# Patient Record
Sex: FEMALE | Race: WHITE | Employment: FULL TIME | URBAN - METROPOLITAN AREA
[De-identification: names, ages, dates, MRNs, and addresses within clinical notes are randomized per-mention and may not be internally consistent; named-entity substitution may affect disease eponyms.]

---

## 2018-11-11 ENCOUNTER — APPOINTMENT (OUTPATIENT)
Dept: GENERAL RADIOLOGY | Age: 38
End: 2018-11-11
Payer: COMMERCIAL

## 2018-11-11 ENCOUNTER — HOSPITAL ENCOUNTER (EMERGENCY)
Age: 38
Discharge: HOME | End: 2018-11-11
Payer: COMMERCIAL

## 2018-11-11 VITALS
DIASTOLIC BLOOD PRESSURE: 98 MMHG | TEMPERATURE: 98.4 F | SYSTOLIC BLOOD PRESSURE: 158 MMHG | RESPIRATION RATE: 16 BRPM | WEIGHT: 179.23 LBS | OXYGEN SATURATION: 98 % | HEART RATE: 96 BPM

## 2018-11-11 DIAGNOSIS — S92.302A MULTIPLE CLOSED FRACTURES OF METATARSAL BONE OF LEFT FOOT, INITIAL ENCOUNTER: Primary | ICD-10-CM

## 2018-11-11 PROCEDURE — 73620 X-RAY EXAM OF FOOT: CPT

## 2018-11-11 PROCEDURE — 4500000023 HC ED LEVEL 3 PROCEDURE

## 2018-11-11 PROCEDURE — 73630 X-RAY EXAM OF FOOT: CPT

## 2018-11-11 PROCEDURE — 6370000000 HC RX 637 (ALT 250 FOR IP): Performed by: PHYSICIAN ASSISTANT

## 2018-11-11 PROCEDURE — 99283 EMERGENCY DEPT VISIT LOW MDM: CPT

## 2018-11-11 RX ORDER — OXYCODONE HYDROCHLORIDE AND ACETAMINOPHEN 5; 325 MG/1; MG/1
1 TABLET ORAL ONCE
Status: COMPLETED | OUTPATIENT
Start: 2018-11-11 | End: 2018-11-11

## 2018-11-11 RX ORDER — OXYCODONE HYDROCHLORIDE AND ACETAMINOPHEN 5; 325 MG/1; MG/1
1 TABLET ORAL EVERY 6 HOURS PRN
Qty: 12 TABLET | Refills: 0 | Status: SHIPPED | OUTPATIENT
Start: 2018-11-11 | End: 2018-11-16

## 2018-11-11 RX ORDER — LORATADINE 10 MG/1
TABLET ORAL
COMMUNITY

## 2018-11-11 RX ADMIN — OXYCODONE AND ACETAMINOPHEN 1 TABLET: 5; 325 TABLET ORAL at 22:27

## 2018-11-11 ASSESSMENT — PAIN DESCRIPTION - LOCATION
LOCATION_2: FOOT
LOCATION: EAR

## 2018-11-11 ASSESSMENT — PAIN DESCRIPTION - PAIN TYPE
TYPE_2: ACUTE PAIN
TYPE: ACUTE PAIN

## 2018-11-11 ASSESSMENT — PAIN DESCRIPTION - DESCRIPTORS
DESCRIPTORS_2: ACHING
DESCRIPTORS: ACHING;SORE

## 2018-11-11 ASSESSMENT — PAIN DESCRIPTION - INTENSITY
RATING_2: 9

## 2018-11-11 ASSESSMENT — PAIN SCALES - GENERAL
PAINLEVEL_OUTOF10: 3
PAINLEVEL_OUTOF10: 2
PAINLEVEL_OUTOF10: 2

## 2018-11-11 ASSESSMENT — PAIN DESCRIPTION - FREQUENCY: FREQUENCY: CONTINUOUS

## 2018-11-11 ASSESSMENT — PAIN DESCRIPTION - DURATION: DURATION_2: CONTINUOUS

## 2018-11-11 ASSESSMENT — PAIN DESCRIPTION - ORIENTATION
ORIENTATION: RIGHT
ORIENTATION_2: LEFT

## 2018-11-12 NOTE — ED PROVIDER NOTES
examination of the right ear and surrounding scalp, negative for swelling, ecchymosis or laceration. Negative for mastoid tenderness, raccoon eyes, or wolfe sign. Right TM normal, ear canal clear. PERRLA and EOM normal bilaterally. Cardiovascular:  Normal rate, regular rhythm, normal heart sounds and intact distal pulses. Pulmonary/Chest:  Effort normal and breath sounds normal. No respiratory distress. Musculoskeletal:  Moderate swelling and ecchymosis noted to the dorsal left foot over the proximal metatarsals, with significant tenderness to palpation in this area. 2+ dorsalis pedis pulse on the left. Normal examination of the left ankle, negative for bony tenderness, with good range of motion. Sensation to light touch grossly intact and capillary refill <3 seconds in the digits of the left lower extremity. Neurological:  Oriented to person, place, and time. No cranial nerve deficit. Skin:  Skin is warm and dry. Not diaphoretic. Psychiatric:  Normal mood, affect, behavior, judgment and thought content. DIAGNOSTIC RESULTS     RADIOLOGY:     Interpretation per the Radiologist below, if available at the time of this note:    XR FOOT LEFT (MIN 3 VIEWS)   Final Result   Acute 2nd, 3rd and 4th metatarsal base fractures. Small linear calcification along the medial aspect of the 1st tarsometatarsal   joint could represent a small avulsion fracture fragment. LABS:  Labs Reviewed - No data to display    All other labs were within normal range or not returned as of this dictation.     EMERGENCY DEPARTMENT COURSE and DIFFERENTIAL DIAGNOSIS/MDM:   Vitals:    Vitals:    11/11/18 2049 11/11/18 2215   BP: (!) 163/117 (!) 158/98   Pulse: 98 96   Resp: 15 16   Temp: 98.6 °F (37 °C) 98.4 °F (36.9 °C)   TempSrc: Temporal Oral   SpO2: 98% 98%   Weight: 179 lb 3.7 oz (81.3 kg)        The patient's condition in the ED was good, the patient was afebrile and nontoxic in appearance, and the patient's CLARE Owens
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
## 2018-11-14 ENCOUNTER — TELEPHONE (OUTPATIENT)
Dept: ORTHOPEDIC SURGERY | Age: 38
End: 2018-11-14

## 2018-11-14 ENCOUNTER — OFFICE VISIT (OUTPATIENT)
Dept: ORTHOPEDIC SURGERY | Age: 38
End: 2018-11-14
Payer: COMMERCIAL

## 2018-11-14 VITALS — BODY MASS INDEX: 28.77 KG/M2 | RESPIRATION RATE: 16 BRPM | WEIGHT: 179 LBS | HEIGHT: 66 IN

## 2018-11-14 DIAGNOSIS — S92.342A CLOSED DISPLACED FRACTURE OF FOURTH METATARSAL BONE OF LEFT FOOT, INITIAL ENCOUNTER: ICD-10-CM

## 2018-11-14 DIAGNOSIS — S92.322A CLOSED DISPLACED FRACTURE OF SECOND METATARSAL BONE OF LEFT FOOT, INITIAL ENCOUNTER: Primary | ICD-10-CM

## 2018-11-14 DIAGNOSIS — S92.332A CLOSED DISPLACED FRACTURE OF THIRD METATARSAL BONE OF LEFT FOOT, INITIAL ENCOUNTER: ICD-10-CM

## 2018-11-14 PROCEDURE — 28470 CLTX METATARSAL FX WO MNP EA: CPT | Performed by: ORTHOPAEDIC SURGERY

## 2018-11-14 PROCEDURE — 99203 OFFICE O/P NEW LOW 30 MIN: CPT | Performed by: ORTHOPAEDIC SURGERY

## 2018-11-14 PROCEDURE — L4361 PNEUMA/VAC WALK BOOT PRE OTS: HCPCS | Performed by: ORTHOPAEDIC SURGERY

## 2018-11-14 RX ORDER — HYDROCODONE BITARTRATE AND ACETAMINOPHEN 5; 325 MG/1; MG/1
1 TABLET ORAL EVERY 6 HOURS PRN
Qty: 21 TABLET | Refills: 0 | Status: SHIPPED | OUTPATIENT
Start: 2018-11-14 | End: 2018-11-21

## 2018-11-14 NOTE — TELEPHONE ENCOUNTER
Pt was just seen today and told she was getting a medication but her pharmacy doesn't have anything for her   pls call pt thanks

## 2018-11-17 NOTE — PROGRESS NOTES
alert, and oriented. She is well dressed, nourished and  groomed. Patient with normal affect. Height is  5' 6\" (1.676 m), weight is 179 lb (81.2 kg), Body mass index is 28.89 kg/m². Resting respiratory rate is 16. Examination of the gait, showed that the patient walks with a crutch, NWB left  leg . Examination of both ankles showing a decreased range of motion of the left ankle compare to the other side because of foot pain. There is moderate swelling that can be seen, as well as ecchymosis over midfoot side of the left foot. She  has intact sensation and good pedal pulses. She has significant tenderness on deep palpation over the 2nd, 3rd and 4th MT base left foot        IMAGING: Xray's were reviewed, dated 11/11/2018, 3 views of the left  foot, and showed a minimally displaced 2nd, 3rd and 4th MT base fracture. IMPRESSION: Left foot minimally displaced 2nd, 3rd and 4th MT base fracture. PLAN: I discussed that the overall alignment of these fracture are good and that we can try to treat this non-operatively in a boot with NWB. We discussed the risk of nonunion and  malunion. We applied a short boot today in the office and instructed the patient  in care. We will see her  back in 6 weeks at which time we will get a new xray of the left foot. Procedures    [place] CLOSED RX METATARSAL FX    [place] CLOSED RX METATARSAL FX    [place] CLOSED RX METATARSAL FX    Breg Short Genisus Walking Boot     Patient was prescribed a Breg Short Genisus Walking Boot. The left foot will require stabilization / immobilization from this semi-rigid / rigid orthosis to improve their function. The orthosis will assist in protecting the affected area, provide functional support and facilitate healing. Patient was instructed to progress ambulation  as non weight bearing in the device. The plan of care is to progress the patient to full weight bearing status.      The patient was educated and fit by a

## 2018-12-21 ENCOUNTER — OFFICE VISIT (OUTPATIENT)
Dept: ORTHOPEDIC SURGERY | Age: 38
End: 2018-12-21

## 2018-12-21 VITALS
HEART RATE: 69 BPM | HEIGHT: 66 IN | SYSTOLIC BLOOD PRESSURE: 140 MMHG | DIASTOLIC BLOOD PRESSURE: 95 MMHG | WEIGHT: 179 LBS | BODY MASS INDEX: 28.77 KG/M2 | RESPIRATION RATE: 16 BRPM

## 2018-12-21 DIAGNOSIS — S92.332A CLOSED DISPLACED FRACTURE OF THIRD METATARSAL BONE OF LEFT FOOT, INITIAL ENCOUNTER: ICD-10-CM

## 2018-12-21 DIAGNOSIS — S92.342A CLOSED DISPLACED FRACTURE OF FOURTH METATARSAL BONE OF LEFT FOOT, INITIAL ENCOUNTER: Primary | ICD-10-CM

## 2018-12-21 DIAGNOSIS — S92.322A CLOSED DISPLACED FRACTURE OF SECOND METATARSAL BONE OF LEFT FOOT, INITIAL ENCOUNTER: ICD-10-CM

## 2018-12-21 PROCEDURE — APPNB30 APP NON BILLABLE TIME 0-30 MINS: Performed by: NURSE PRACTITIONER

## 2018-12-21 PROCEDURE — 99024 POSTOP FOLLOW-UP VISIT: CPT | Performed by: NURSE PRACTITIONER

## 2018-12-21 NOTE — LETTER
[facility]  [address]  [address] [facility] [address]  Phone: [phone]  Fax: [phone]    Camila Huggins MD        December 21, 2018     Patient: Kiera Albarran   YOB: 1980   Date of Visit: 12/21/2018       To Whom It May Concern: It is my medical opinion that Kiera Albarran requires a disability parking placard for the following reasons:  She has limited walking ability due to an orthopedic condition. Duration of need: 3 months    If you have any questions or concerns, please don't hesitate to call.     Sincerely,          Camila Huggins MD

## 2019-02-22 ENCOUNTER — OFFICE VISIT (OUTPATIENT)
Dept: ORTHOPEDIC SURGERY | Age: 39
End: 2019-02-22
Payer: COMMERCIAL

## 2019-02-22 VITALS — BODY MASS INDEX: 28.77 KG/M2 | WEIGHT: 179 LBS | HEART RATE: 60 BPM | RESPIRATION RATE: 16 BRPM | HEIGHT: 66 IN

## 2019-02-22 DIAGNOSIS — S92.342A CLOSED DISPLACED FRACTURE OF FOURTH METATARSAL BONE OF LEFT FOOT, INITIAL ENCOUNTER: ICD-10-CM

## 2019-02-22 DIAGNOSIS — S92.332A CLOSED DISPLACED FRACTURE OF THIRD METATARSAL BONE OF LEFT FOOT, INITIAL ENCOUNTER: ICD-10-CM

## 2019-02-22 DIAGNOSIS — S92.322A CLOSED DISPLACED FRACTURE OF SECOND METATARSAL BONE OF LEFT FOOT, INITIAL ENCOUNTER: Primary | ICD-10-CM

## 2019-02-22 PROCEDURE — 99213 OFFICE O/P EST LOW 20 MIN: CPT | Performed by: NURSE PRACTITIONER

## 2020-05-16 ENCOUNTER — APPOINTMENT (OUTPATIENT)
Dept: GENERAL RADIOLOGY | Age: 40
End: 2020-05-16
Payer: COMMERCIAL

## 2020-05-16 ENCOUNTER — HOSPITAL ENCOUNTER (EMERGENCY)
Age: 40
Discharge: HOME | End: 2020-05-16
Payer: COMMERCIAL

## 2020-05-16 VITALS
BODY MASS INDEX: 30.07 KG/M2 | OXYGEN SATURATION: 98 % | HEART RATE: 91 BPM | WEIGHT: 191.58 LBS | HEIGHT: 67 IN | DIASTOLIC BLOOD PRESSURE: 97 MMHG | SYSTOLIC BLOOD PRESSURE: 151 MMHG | RESPIRATION RATE: 24 BRPM | TEMPERATURE: 98.5 F

## 2020-05-16 PROCEDURE — 96372 THER/PROPH/DIAG INJ SC/IM: CPT

## 2020-05-16 PROCEDURE — 2580000003 HC RX 258: Performed by: NURSE PRACTITIONER

## 2020-05-16 PROCEDURE — 90471 IMMUNIZATION ADMIN: CPT | Performed by: NURSE PRACTITIONER

## 2020-05-16 PROCEDURE — 96374 THER/PROPH/DIAG INJ IV PUSH: CPT

## 2020-05-16 PROCEDURE — 12011 RPR F/E/E/N/L/M 2.5 CM/<: CPT

## 2020-05-16 PROCEDURE — 2500000003 HC RX 250 WO HCPCS: Performed by: NURSE PRACTITIONER

## 2020-05-16 PROCEDURE — 73130 X-RAY EXAM OF HAND: CPT

## 2020-05-16 PROCEDURE — 99283 EMERGENCY DEPT VISIT LOW MDM: CPT

## 2020-05-16 PROCEDURE — 6360000002 HC RX W HCPCS: Performed by: NURSE PRACTITIONER

## 2020-05-16 PROCEDURE — 90715 TDAP VACCINE 7 YRS/> IM: CPT | Performed by: NURSE PRACTITIONER

## 2020-05-16 RX ORDER — SACCHAROMYCES BOULARDII 250 MG
250 CAPSULE ORAL 3 TIMES DAILY
Qty: 21 CAPSULE | Refills: 0 | Status: SHIPPED | OUTPATIENT
Start: 2020-05-16 | End: 2020-05-23

## 2020-05-16 RX ORDER — FLUCONAZOLE 150 MG/1
150 TABLET ORAL ONCE
Qty: 2 TABLET | Refills: 0 | Status: SHIPPED | OUTPATIENT
Start: 2020-05-16 | End: 2020-05-16

## 2020-05-16 RX ORDER — MORPHINE SULFATE 4 MG/ML
4 INJECTION, SOLUTION INTRAMUSCULAR; INTRAVENOUS ONCE
Status: COMPLETED | OUTPATIENT
Start: 2020-05-16 | End: 2020-05-16

## 2020-05-16 RX ORDER — AMOXICILLIN AND CLAVULANATE POTASSIUM 875; 125 MG/1; MG/1
1 TABLET, FILM COATED ORAL 2 TIMES DAILY
Qty: 20 TABLET | Refills: 0 | Status: SHIPPED | OUTPATIENT
Start: 2020-05-16 | End: 2020-05-26

## 2020-05-16 RX ORDER — HYDROCODONE BITARTRATE AND ACETAMINOPHEN 5; 325 MG/1; MG/1
1 TABLET ORAL EVERY 6 HOURS PRN
Qty: 12 TABLET | Refills: 0 | Status: SHIPPED | OUTPATIENT
Start: 2020-05-16 | End: 2020-05-19

## 2020-05-16 RX ORDER — BUPIVACAINE HYDROCHLORIDE AND EPINEPHRINE 5; 5 MG/ML; UG/ML
10 INJECTION, SOLUTION EPIDURAL; INTRACAUDAL; PERINEURAL ONCE
Status: COMPLETED | OUTPATIENT
Start: 2020-05-16 | End: 2020-05-16

## 2020-05-16 RX ORDER — IBUPROFEN 800 MG/1
800 TABLET ORAL EVERY 8 HOURS PRN
Qty: 30 TABLET | Refills: 0 | Status: ON HOLD | OUTPATIENT
Start: 2020-05-16 | End: 2022-08-26 | Stop reason: SDUPTHER

## 2020-05-16 RX ADMIN — BUPIVACAINE HYDROCHLORIDE AND EPINEPHRINE BITARTRATE 10 ML: 5; .005 INJECTION, SOLUTION EPIDURAL; INTRACAUDAL; PERINEURAL at 21:34

## 2020-05-16 RX ADMIN — MORPHINE SULFATE 4 MG: 4 INJECTION, SOLUTION INTRAMUSCULAR; INTRAVENOUS at 20:56

## 2020-05-16 RX ADMIN — AMPICILLIN SODIUM AND SULBACTAM SODIUM 3 G: 2; 1 INJECTION, POWDER, FOR SOLUTION INTRAMUSCULAR; INTRAVENOUS at 21:34

## 2020-05-16 RX ADMIN — TETANUS TOXOID, REDUCED DIPHTHERIA TOXOID AND ACELLULAR PERTUSSIS VACCINE, ADSORBED 0.5 ML: 5; 2.5; 8; 8; 2.5 SUSPENSION INTRAMUSCULAR at 20:54

## 2020-05-16 ASSESSMENT — PAIN SCALES - GENERAL
PAINLEVEL_OUTOF10: 4
PAINLEVEL_OUTOF10: 9
PAINLEVEL_OUTOF10: 4
PAINLEVEL_OUTOF10: 9

## 2020-05-16 ASSESSMENT — PAIN DESCRIPTION - PROGRESSION: CLINICAL_PROGRESSION: GRADUALLY WORSENING

## 2020-05-16 ASSESSMENT — PAIN DESCRIPTION - PAIN TYPE: TYPE: ACUTE PAIN

## 2020-05-16 ASSESSMENT — PAIN DESCRIPTION - ONSET: ONSET: SUDDEN

## 2020-05-16 ASSESSMENT — PAIN DESCRIPTION - ORIENTATION: ORIENTATION: LEFT

## 2020-05-16 ASSESSMENT — PAIN DESCRIPTION - LOCATION: LOCATION: HAND

## 2020-05-16 ASSESSMENT — ENCOUNTER SYMPTOMS: COLOR CHANGE: 0

## 2020-05-16 ASSESSMENT — PAIN - FUNCTIONAL ASSESSMENT: PAIN_FUNCTIONAL_ASSESSMENT: PREVENTS OR INTERFERES SOME ACTIVE ACTIVITIES AND ADLS

## 2020-05-16 ASSESSMENT — PAIN DESCRIPTION - DESCRIPTORS: DESCRIPTORS: ACHING;BURNING;THROBBING

## 2020-05-16 ASSESSMENT — PAIN DESCRIPTION - FREQUENCY: FREQUENCY: CONTINUOUS

## 2020-05-17 NOTE — ED PROVIDER NOTES
visualized      Contaminated: no    Treatment:     Wound cleansed with: Chlorhexidine. Amount of cleaning:  Extensive  Skin repair:     Repair method:  Sutures    Suture size:  6-0    Suture material:  Nylon    Suture technique:  Simple interrupted    Number of sutures:  2  Approximation:     Approximation:  Loose  Post-procedure details:     Dressing:  Sterile dressing    Patient tolerance of procedure: Tolerated well, no immediate complications  Lac Repair  Date/Time: 5/16/2020 10:33 PM  Performed by: ROLANDO Welch CNP  Authorized by: ROLANDO Welch CNP     Consent:     Consent obtained:  Verbal    Consent given by:  Patient    Risks discussed:  Infection, need for additional repair, nerve damage, poor wound healing, poor cosmetic result, pain, retained foreign body, tendon damage and vascular damage    Alternatives discussed:  No treatment  Anesthesia (see MAR for exact dosages): Anesthesia method:  Local infiltration    Local anesthetic:  Bupivacaine 0.5% WITH epi  Laceration details:     Location:  Finger    Finger location:  L small finger    Length (cm):  1  Repair type:     Repair type:  Simple  Pre-procedure details:     Preparation:  Imaging obtained to evaluate for foreign bodies and patient was prepped and draped in usual sterile fashion  Exploration:     Hemostasis achieved with:  Epinephrine and direct pressure    Wound exploration: wound explored through full range of motion and entire depth of wound probed and visualized      Contaminated: no    Treatment:     Wound cleansed with: Chlorhexidine. Amount of cleaning:  Extensive  Skin repair:     Repair method:  Sutures    Suture size:  6-0    Suture material:  Nylon    Suture technique:  Simple interrupted    Number of sutures:  1  Approximation:     Approximation:  Loose  Post-procedure details:     Dressing:  Sterile dressing    Patient tolerance of procedure:   Tolerated well, no immediate complications          Patient was given:  Medications   ampicillin-sulbactam (UNASYN) 3 g in sodium chloride 0.9 % 100 mL IVPB (0 g Intravenous Stopped 5/16/20 2228)   Tetanus-Diphth-Acell Pertussis (BOOSTRIX) injection 0.5 mL (0.5 mLs Intramuscular Given 5/16/20 2054)   morphine (PF) injection 4 mg (4 mg Intravenous Given 5/16/20 2056)   bupivacaine-EPINEPHrine PF (MARCAINE-w/EPINEPHRINE) 0.5% -[id number] injection 10 mL (10 mLs Intradermal Given by Other 5/16/20 2134)       We discussed the issue of likelihood of rabies in the offending animal and risk/benefits of the rabies vaccination. I explained to the patient that there have been no incidences of rabies from dog bites in [place], and that vaccination is currently not recommended by the Health Department. Differential diagnosis: Cellulitis, abscess, foreign body retention (tooth), tetanus, rabies, deep space infection, osteomyelitis, septic joint, other     Patient is afebrile and nontoxic in appearance. Plain films as above, no retained foreign body. No crepitus. Distal extremity is pink and well-perfused. Extensor and flexor tendons intact. No bone tendon or foreign body exposed. Wounds were anesthetized and then copiously irrigated and vigorously scrubbed. X-ray shows no fracture or foreign body. She had one laceration over the DIP of the fifth digit of the left hand, so I put 1 suture in it to loosely approximated. She also had a gaping 2 cm laceration on the dorsum of the left hand, so I put 2 sutures in it to loosely approximated. Other puncture wounds were not closed obviously. Due to the extent of puncture wounds I gave her dose of IV Unasyn in the ER and discharged home with Augmentin. I strongly encouraged her to follow-up with hand surgery on Monday for reassessment. I advised her to return immediately for any signs of infection and gave her the symptoms of that.   At this time, the evidence for any other entities in the differential is insufficient to justify any ROLANDO - Giovanna Zamora, ROLANDO - CNP  05/16/20 2805

## 2020-05-17 NOTE — ED NOTES
Went in pt room to clean wound on left hand. With soap and water. per NP junito order.       Benito Aguilar  05/16/20 0552

## 2020-05-18 ENCOUNTER — TELEPHONE (OUTPATIENT)
Dept: ORTHOPEDIC SURGERY | Age: 40
End: 2020-05-18

## 2020-05-19 ENCOUNTER — VIRTUAL VISIT (OUTPATIENT)
Dept: ORTHOPEDIC SURGERY | Age: 40
End: 2020-05-19
Payer: COMMERCIAL

## 2020-05-19 VITALS — BODY MASS INDEX: 28.93 KG/M2 | HEIGHT: 66 IN | WEIGHT: 180 LBS

## 2020-05-19 PROCEDURE — 26600 TREAT METACARPAL FRACTURE: CPT | Performed by: ORTHOPAEDIC SURGERY

## 2020-05-19 PROCEDURE — 99203 OFFICE O/P NEW LOW 30 MIN: CPT | Performed by: ORTHOPAEDIC SURGERY

## 2020-05-19 NOTE — PATIENT INSTRUCTIONS
Protocol for Managing Open Wounds  Dr. Uriel Mckeon            1. Please remove all dressings so that you may see the skin fully. 2. Prepare a clean sink full of warm water (bath temperature). Add one to two squirts of liquid antibacterial soap. Insert hand and soak for approximately five minutes, making sure to exercise your motion of fingers and wrists fully while soaking in warm water. 3. After finished soaking, pat wound dry. 4. Apply dressing as instructed in the office. A. Layer #1 - Adaptic gauze. B. Layer #2 - Light cotton gauze. Suan Sera #3 - Tape or other covering. 5. I typically recommend soaking two to three times per day with a clean dressing change after each soak. Hand Range of Motion Instructions      Dr. Judy Beaver    Be cautions in resuming fulll activities and use of the hand for next 2 - 4 weeks. Perform the following exercises VIGOROUSLY at least four times a day. Exercises should be performed in the seated position with elbow on tabletop or other firm surface. If you cannot make these motions on your own, you may use other hand to assist in making these motions. Fully straighten fingers until hand is flat. Fully bend fingers until hand is in a full fist.   Bend wrist forward and backward (grasp hand around knuckles with other hand to do so). Rotate forearm so that your palm faces towards your face. Rotate forearm so that your palm faces away from your face (grasp hand around wrist with other hand to do so). Fully straighten elbow. Fully bend elbow. Continue light use of the hand progressing to more normal us as it feels comfortable to do so. In 2 - 4 weeks you may discontinue using the brace (if you were using one) and resume normal use of the hand and wrist if you have regained full and painless motion and function.   If you are unable to achieve  full and painless motion and function over 4 weeks, please call the office at [phone] to schedule a follow-up

## 2020-05-19 NOTE — PROGRESS NOTES
TELEHEALTH EVALUATION -- Audio/Visual (During [id number]-81 public health emergency)    I have explained to Ms. Inessa Larson that a Physical Examination is inherently limited by the nature of telemedicine and that this may lead to delayed or inadequate diagnosis. She is also explained that she may require a higher level of care if a diagnosis can not be reasonably established with a virtual visit. Ms. Inessa Larson has provided her Consent to proceed with a Virtual Visit today. Ms. Inessa Larson was personally present on the video link with me today. Ms. Inessa Larson appeared to be in her own private environment during the visit, while I conducted this visit from my office. This visit was completed virtually using the [employer] platform. ------------------------------------------------------------------------------------------------------------------------  Ms. Inessa Larson is a 44 y.o. right handed woman  who is seen today in Hand Surgical evaluation. She is seen today regarding an injury occurring on May 16th, 2020. She reports having sustained a laceration to the Ulnar and Dorsal aspect of the  left hand and a puncture wound over the dorsum of the left Small Finger and in the 4th web when breaking up a dog fight. At the time of injury, there was not substantial bleeding & there was not acute sensory loss. She was seen for Emergency evaluation elsewhere, radiographs were obtained. By report, her skin injury was repaired after the wound was thoroughly cleansed. The nail structures were not involved in the injury and did not require repair at the time. She reports moderate but improving pain located in the Ulnar and Dorsal aspect of the Small Finger and Whole Hand, no tenderness of the wrist or elbow. She notes today, no numbness in the Radial Digital Nerve and Ulnar Digital Nerve distribution of the Index Finger, Middle Finger, Ring Finger and Small Finger. Symptoms are improving over time.       The self-palpation of the Ulnar and Proximal region of the hand specifically at the base of the Small Finger metacarpal on the Left, normal on the Right. The base of the hand & wrist are not visibly deformed or malaligned. Kanavel's Signs (for suppurative flexor tenosynovitis) are generally Negative in the left Middle Finger, Ring Finger and Small Finger. The involved finger is not held at rest in a position of flexion,  & is not exceedingly painful with active flexion and extension. Clinical Photos:    ER  Photos:      Site of max Pain-        Dorsal View with swelling-      Small Finger Injury-      Digital Extension without significant deficit-      Palmar View-      Digital Extension-      Digital Flexion with pain, but tendons apparently intact-          Radiographic Evaluation:  Radiographs were reviewed today (3 views of the left hand). They demonstrate subcutaneous gas along the ulnar border of the hand. There appears to be subtle lucency through the base of the Small Finger metacarpal consistent with nondisplaced fracture, without subluxation, or dislocation. There is no degenerative change at the 1st [facility] joint, no degenerative change at the small joints of the fingers diffusely. Impression:  Ms. Kenisha Moore has sustained recent left dorsal hand dog bite laceration without associated nerve laceration, without associated tendon laceration. She also appears to have nondisplaced 5th metacarpal base fracture. She  presents requesting further treatment. Plan:  I have discussed with Ms. Kenisha Moore the various treatment options for treatment of her left dorsal hand Laceration. We discussed the options of Conservative treatment, allowing the soft tissues and bony structures to heal as well as possible, and surgical treatment in the form of formal exploration and repair of the injured structures as possible.   she has elected to proceed conservativly, voicing an understanding of the other options

## 2020-05-29 ENCOUNTER — OFFICE VISIT (OUTPATIENT)
Dept: ORTHOPEDIC SURGERY | Age: 40
End: 2020-05-29
Payer: COMMERCIAL

## 2020-05-29 VITALS — HEIGHT: 66 IN | WEIGHT: 179.9 LBS | BODY MASS INDEX: 28.91 KG/M2
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
## 2020-05-29 PROBLEM — S61.452A DOG BITE OF LEFT HAND: Status: ACTIVE | Noted: 2020-05-29

## 2020-05-29 PROCEDURE — 99024 POSTOP FOLLOW-UP VISIT: CPT | Performed by: PHYSICIAN ASSISTANT

## 2020-05-29 PROCEDURE — L3908 WHO COCK-UP NONMOLDE PRE OTS: HCPCS | Performed by: PHYSICIAN ASSISTANT

## 2020-05-29 NOTE — PROGRESS NOTES
modalities were demonstrated to her today. We discussed the continued restrictions on the use of the injured hand & wrist and the limitations on resumption of activities. We discussed the option of pursuing formalized hand therapy and a prescription  was not indicated. Ms. Evelin Mead's sutures were removed today without difficulty. Skin remained well approximated. I have asked Ms. Ana Clifton to follow-up with me in approximately 2 weeks from now for re-evaluation and repeat radiographs out of splint/cast    I have asked her to work on her range of motion of her small finger over the next two weeks. If she is still having trouble with motion at her next visit, we will further discuss therapy for possible tendon adhesion. She is also specifically instructed to return to the office or call for an appointment sooner if her symptoms are changing or worsening prior to that time.

## 2020-06-15 ENCOUNTER — OFFICE VISIT (OUTPATIENT)
Dept: ORTHOPEDIC SURGERY | Age: 40
End: 2020-06-15

## 2020-06-15 VITALS — RESPIRATION RATE: 16 BRPM | TEMPERATURE: 99.5 F

## 2020-06-15 PROCEDURE — 99024 POSTOP FOLLOW-UP VISIT: CPT | Performed by: ORTHOPAEDIC SURGERY

## 2020-08-21 ENCOUNTER — OFFICE VISIT (OUTPATIENT)
Dept: ORTHOPEDIC SURGERY | Age: 40
End: 2020-08-21
Payer: COMMERCIAL

## 2020-08-21 VITALS — TEMPERATURE: 97.8 F | WEIGHT: 179 LBS | HEIGHT: 66 IN | BODY MASS INDEX: 28.77 KG/M2 | RESPIRATION RATE: 16 BRPM

## 2020-08-21 PROCEDURE — 99213 OFFICE O/P EST LOW 20 MIN: CPT | Performed by: ORTHOPAEDIC SURGERY

## 2020-08-21 NOTE — PATIENT INSTRUCTIONS
rest and begin again. You may find that a different area is now the most tender spot. This exercise should be done as many times as possible during the day. It takes many thousands of taps to really change the tenderness in a sore area. The sooner you accumulate that many taps, the sooner your wounds will be more comfortable. FRICTION MASSAGE:    The goal of friction massage is to [facility] the scar tissue beneath the skin. As with percussion, it should be done many times during the day. This exercise not only helps improve tenderness, but helps restore the contour of the skin to a more normal appearance. Here is how to do friction massage:    1. Place a finger tip of your other hand against the central area of the scar. 2. Mentally note four directions that the skin can be pushed sideways: near, far, left and right. 3. With your finger tip pressed firmly against the scar and without sliding, gently but steadily push the skin to one side as if you were trying to slide the skin off of the bone. Hold this position for five seconds. 4. Briefly relax and then repeat this maneuver in one of the other directions. Make sure you attempt to slide the skin in all four directions. 5. If the scar is wider than your finger tip, repeat this stretching exercise on every point of the scar. This exercise is done without any skin lubrication. Remember to do this exercise before applying any antibiotic ointment or moisturizing creams.

## 2020-08-21 NOTE — PROGRESS NOTES
Ms. Andrews Leiva returns today in follow-up of her recent left hand dog bite injury with Small Finger metacarpal fracture which occurred approximately 3 months ago. She has fully healed her injury but returns today with some residual symptoms. She has noted persistent discomfort with activity and use and some residual tenderness at the laceration site without any swelling. no symptoms of infection. She notes no symptoms of numbness other that at the laceration site, tingling, no symptoms related to perfusion. The patient's , past medical history, medications, allergies,  family history, social history, and review of systems have been reviewed and are recorded in the chart. Physical Exam:  Vitals  Temp: 97.8 °F (36.6 °C)  Temp Source: Infrared  Resp: 16  Height: 5' 5.98\" (167.6 cm)  Weight: 179 lb (81.2 kg)  Ms. Andrews Leiva appears well, she is in no apparent distress, she demonstrates appropriate mood & affect. Skin: Laceration is fully healed, mildly tender and hypersensitive on the dorsal hand on the Left, normal on the Right  Digits: Full and equal bilaterally range of motion  bilaterally. Thumb shows Full range of motion bilaterally. FDS, FDP, and Common Extensor tendon function is intact to each digit. FPL & EPL tendon function is intact to the thumb. Wrist range of motion is without significant limitation bilaterally  There is no evidence of gross joint instability bilaterally. Sensation is normal in the Thumb, Index Finger, Middle Finger, Ring Finger and Small Finger on the Left, normal on the Right   She does report regional  'tingling' about the laceration site. Vascular examination reveals normal and good capillary refill bilaterally  Swelling is absent in the injured digit(s) on the Left, normal on the Right  There is no erythema, redness & induration at the injury site. Muscular strength is clinically appropriate bilaterally.         Impression:  Ms. Andrews Leiva is doing fairly well

## 2022-02-07 ENCOUNTER — HOSPITAL ENCOUNTER (OUTPATIENT)
Dept: ULTRASOUND IMAGING | Age: 42
Discharge: HOME | End: 2022-02-07
Payer: COMMERCIAL

## 2022-02-07 DIAGNOSIS — R92.8 ABNORMAL MAMMOGRAM: ICD-10-CM

## 2022-02-07 PROCEDURE — 76642 ULTRASOUND BREAST LIMITED: CPT

## 2022-08-26 ENCOUNTER — APPOINTMENT (OUTPATIENT)
Dept: CT IMAGING | Age: 42
End: 2022-08-26
Payer: COMMERCIAL

## 2022-08-26 ENCOUNTER — ANESTHESIA EVENT (OUTPATIENT)
Dept: OPERATING ROOM | Age: 42
End: 2022-08-26
Payer: COMMERCIAL

## 2022-08-26 ENCOUNTER — HOSPITAL ENCOUNTER (OUTPATIENT)
Age: 42
Setting detail: OBSERVATION
Discharge: HOME | End: 2022-08-26
Attending: EMERGENCY MEDICINE | Admitting: SURGERY
Payer: COMMERCIAL

## 2022-08-26 ENCOUNTER — ANESTHESIA (OUTPATIENT)
Dept: OPERATING ROOM | Age: 42
End: 2022-08-26
Payer: COMMERCIAL

## 2022-08-26 VITALS
HEIGHT: 67 IN | RESPIRATION RATE: 16 BRPM | SYSTOLIC BLOOD PRESSURE: 120 MMHG | OXYGEN SATURATION: 97 % | WEIGHT: 181.22 LBS | BODY MASS INDEX: 28.44 KG/M2 | DIASTOLIC BLOOD PRESSURE: 74 MMHG | TEMPERATURE: 97.3 F | HEART RATE: 65 BPM

## 2022-08-26 DIAGNOSIS — K35.30 ACUTE APPENDICITIS WITH LOCALIZED PERITONITIS, WITHOUT PERFORATION, ABSCESS, OR GANGRENE: ICD-10-CM

## 2022-08-26 DIAGNOSIS — K35.20 ACUTE APPENDICITIS WITH GENERALIZED PERITONITIS, WITHOUT GANGRENE OR ABSCESS, UNSPECIFIED WHETHER PERFORATION PRESENT: Primary | ICD-10-CM

## 2022-08-26 DIAGNOSIS — K35.80 ACUTE APPENDICITIS, UNSPECIFIED ACUTE APPENDICITIS TYPE: ICD-10-CM

## 2022-08-26 LAB
A/G RATIO: 1.6 (ref 1.1–2.2)
ALBUMIN SERPL-MCNC: 4.2 G/DL (ref 3.4–5)
ALP BLD-CCNC: 59 U/L (ref 40–129)
ALT SERPL-CCNC: 16 U/L (ref 10–40)
ANION GAP SERPL CALCULATED.3IONS-SCNC: 13 MMOL/L (ref 3–16)
AST SERPL-CCNC: 7 U/L (ref 15–37)
BASOPHILS ABSOLUTE: 0.1 K/UL (ref 0–0.2)
BASOPHILS RELATIVE PERCENT: 0.4 %
BILIRUB SERPL-MCNC: 0.4 MG/DL (ref 0–1)
BILIRUBIN URINE: NEGATIVE
BLOOD, URINE: NEGATIVE
BUN BLDV-MCNC: 13 MG/DL (ref 7–20)
CALCIUM SERPL-MCNC: 8.9 MG/DL (ref 8.3–10.6)
CHLORIDE BLD-SCNC: 99 MMOL/L (ref 99–110)
CLARITY: CLEAR
CO2: 25 MMOL/L (ref 21–32)
COLOR: YELLOW
CREAT SERPL-MCNC: 0.5 MG/DL (ref 0.6–1.1)
EOSINOPHILS ABSOLUTE: 0 K/UL (ref 0–0.6)
EOSINOPHILS RELATIVE PERCENT: 0.1 %
GFR AFRICAN AMERICAN: >60
GFR NON-AFRICAN AMERICAN: >60
GLUCOSE BLD-MCNC: 134 MG/DL (ref 70–99)
GLUCOSE URINE: NEGATIVE MG/DL
HCG(URINE) PREGNANCY TEST: NEGATIVE
HCT VFR BLD CALC: 41.6 % (ref 36–48)
HEMOGLOBIN: 14.6 G/DL (ref 12–16)
KETONES, URINE: ABNORMAL MG/DL
LEUKOCYTE ESTERASE, URINE: NEGATIVE
LYMPHOCYTES ABSOLUTE: 0.9 K/UL (ref 1–5.1)
LYMPHOCYTES RELATIVE PERCENT: 6 %
MAGNESIUM: 1.9 MG/DL (ref 1.8–2.4)
MCH RBC QN AUTO: 32.5 PG (ref 26–34)
MCHC RBC AUTO-ENTMCNC: 35.1 G/DL (ref 31–36)
MCV RBC AUTO: 92.8 FL (ref 80–100)
MICROSCOPIC EXAMINATION: ABNORMAL
MONOCYTES ABSOLUTE: 0.8 K/UL (ref 0–1.3)
MONOCYTES RELATIVE PERCENT: 5.1 %
NEUTROPHILS ABSOLUTE: 13.9 K/UL (ref 1.7–7.7)
NEUTROPHILS RELATIVE PERCENT: 88.4 %
NITRITE, URINE: NEGATIVE
PDW BLD-RTO: 13.2 % (ref 12.4–15.4)
PH UA: 6.5 (ref 5–8)
PLATELET # BLD: 343 K/UL (ref 135–450)
PMV BLD AUTO: 6.3 FL (ref 5–10.5)
POTASSIUM REFLEX MAGNESIUM: 3.3 MMOL/L (ref 3.5–5.1)
PROTEIN UA: NEGATIVE MG/DL
RBC # BLD: 4.48 M/UL (ref 4–5.2)
SODIUM BLD-SCNC: 137 MMOL/L (ref 136–145)
SPECIFIC GRAVITY UA: 1.02 (ref 1–1.03)
TOTAL PROTEIN: 6.9 G/DL (ref 6.4–8.2)
URINE REFLEX TO CULTURE: ABNORMAL
URINE TYPE: ABNORMAL
UROBILINOGEN, URINE: 0.2 E.U./DL
WBC # BLD: 15.8 K/UL (ref 4–11)

## 2022-08-26 PROCEDURE — 2580000003 HC RX 258: Performed by: SURGERY

## 2022-08-26 PROCEDURE — 6360000004 HC RX CONTRAST MEDICATION: Performed by: EMERGENCY MEDICINE

## 2022-08-26 PROCEDURE — 2500000003 HC RX 250 WO HCPCS: Performed by: SURGERY

## 2022-08-26 PROCEDURE — 2580000003 HC RX 258: Performed by: NURSE ANESTHETIST, CERTIFIED REGISTERED

## 2022-08-26 PROCEDURE — 6360000002 HC RX W HCPCS: Performed by: NURSE ANESTHETIST, CERTIFIED REGISTERED

## 2022-08-26 PROCEDURE — 3700000000 HC ANESTHESIA ATTENDED CARE: Performed by: SURGERY

## 2022-08-26 PROCEDURE — 6370000000 HC RX 637 (ALT 250 FOR IP): Performed by: ANESTHESIOLOGY

## 2022-08-26 PROCEDURE — 6360000002 HC RX W HCPCS: Performed by: ANESTHESIOLOGY

## 2022-08-26 PROCEDURE — 2580000003 HC RX 258: Performed by: EMERGENCY MEDICINE

## 2022-08-26 PROCEDURE — 7100000010 HC PHASE II RECOVERY - FIRST 15 MIN: Performed by: SURGERY

## 2022-08-26 PROCEDURE — 2709999900 HC NON-CHARGEABLE SUPPLY: Performed by: SURGERY

## 2022-08-26 PROCEDURE — G0378 HOSPITAL OBSERVATION PER HR: HCPCS

## 2022-08-26 PROCEDURE — 84703 CHORIONIC GONADOTROPIN ASSAY: CPT

## 2022-08-26 PROCEDURE — 6360000002 HC RX W HCPCS: Performed by: EMERGENCY MEDICINE

## 2022-08-26 PROCEDURE — 85025 COMPLETE CBC W/AUTO DIFF WBC: CPT

## 2022-08-26 PROCEDURE — 96365 THER/PROPH/DIAG IV INF INIT: CPT

## 2022-08-26 PROCEDURE — 99285 EMERGENCY DEPT VISIT HI MDM: CPT

## 2022-08-26 PROCEDURE — 88304 TISSUE EXAM BY PATHOLOGIST: CPT

## 2022-08-26 PROCEDURE — 7100000000 HC PACU RECOVERY - FIRST 15 MIN: Performed by: SURGERY

## 2022-08-26 PROCEDURE — 7100000011 HC PHASE II RECOVERY - ADDTL 15 MIN: Performed by: SURGERY

## 2022-08-26 PROCEDURE — 44970 LAPAROSCOPY APPENDECTOMY: CPT | Performed by: SURGERY

## 2022-08-26 PROCEDURE — 74177 CT ABD & PELVIS W/CONTRAST: CPT

## 2022-08-26 PROCEDURE — 96374 THER/PROPH/DIAG INJ IV PUSH: CPT

## 2022-08-26 PROCEDURE — 81003 URINALYSIS AUTO W/O SCOPE: CPT

## 2022-08-26 PROCEDURE — 99219 PR INITIAL OBSERVATION CARE/DAY 50 MINUTES: CPT | Performed by: SURGERY

## 2022-08-26 PROCEDURE — 7100000001 HC PACU RECOVERY - ADDTL 15 MIN: Performed by: SURGERY

## 2022-08-26 PROCEDURE — 96361 HYDRATE IV INFUSION ADD-ON: CPT

## 2022-08-26 PROCEDURE — 80053 COMPREHEN METABOLIC PANEL: CPT

## 2022-08-26 PROCEDURE — 3700000001 HC ADD 15 MINUTES (ANESTHESIA): Performed by: SURGERY

## 2022-08-26 PROCEDURE — 2500000003 HC RX 250 WO HCPCS

## 2022-08-26 PROCEDURE — 83735 ASSAY OF MAGNESIUM: CPT

## 2022-08-26 PROCEDURE — 96375 TX/PRO/DX INJ NEW DRUG ADDON: CPT

## 2022-08-26 PROCEDURE — 3600000004 HC SURGERY LEVEL 4 BASE: Performed by: SURGERY

## 2022-08-26 PROCEDURE — A4217 STERILE WATER/SALINE, 500 ML: HCPCS | Performed by: SURGERY

## 2022-08-26 PROCEDURE — 2500000003 HC RX 250 WO HCPCS: Performed by: NURSE ANESTHETIST, CERTIFIED REGISTERED

## 2022-08-26 PROCEDURE — 3600000014 HC SURGERY LEVEL 4 ADDTL 15MIN: Performed by: SURGERY

## 2022-08-26 RX ORDER — MIDAZOLAM HYDROCHLORIDE 1 MG/ML
INJECTION INTRAMUSCULAR; INTRAVENOUS PRN
Status: DISCONTINUED | OUTPATIENT
Start: 2022-08-26 | End: 2022-08-26 | Stop reason: SDUPTHER

## 2022-08-26 RX ORDER — ROCURONIUM BROMIDE 10 MG/ML
INJECTION, SOLUTION INTRAVENOUS PRN
Status: DISCONTINUED | OUTPATIENT
Start: 2022-08-26 | End: 2022-08-26 | Stop reason: SDUPTHER

## 2022-08-26 RX ORDER — DOCUSATE SODIUM 100 MG/1
100 CAPSULE, LIQUID FILLED ORAL 2 TIMES DAILY PRN
Qty: 60 CAPSULE | Refills: 0 | Status: SHIPPED | OUTPATIENT
Start: 2022-08-26

## 2022-08-26 RX ORDER — OXYCODONE HYDROCHLORIDE 10 MG/1
10 TABLET ORAL PRN
Status: COMPLETED | OUTPATIENT
Start: 2022-08-26 | End: 2022-08-26

## 2022-08-26 RX ORDER — SODIUM CHLORIDE 0.9 % (FLUSH) 0.9 %
5-40 SYRINGE (ML) INJECTION EVERY 12 HOURS SCHEDULED
Status: DISCONTINUED | OUTPATIENT
Start: 2022-08-26 | End: 2022-08-26 | Stop reason: HOSPADM

## 2022-08-26 RX ORDER — PANTOPRAZOLE SODIUM 40 MG/1
40 TABLET, DELAYED RELEASE ORAL DAILY
Status: CANCELLED | OUTPATIENT
Start: 2022-08-26

## 2022-08-26 RX ORDER — SODIUM CHLORIDE 9 MG/ML
INJECTION, SOLUTION INTRAVENOUS PRN
Status: DISCONTINUED | OUTPATIENT
Start: 2022-08-26 | End: 2022-08-26 | Stop reason: HOSPADM

## 2022-08-26 RX ORDER — FENTANYL CITRATE 50 UG/ML
25 INJECTION, SOLUTION INTRAMUSCULAR; INTRAVENOUS EVERY 5 MIN PRN
Status: DISCONTINUED | OUTPATIENT
Start: 2022-08-26 | End: 2022-08-26 | Stop reason: HOSPADM

## 2022-08-26 RX ORDER — DEXAMETHASONE SODIUM PHOSPHATE 4 MG/ML
INJECTION, SOLUTION INTRA-ARTICULAR; INTRALESIONAL; INTRAMUSCULAR; INTRAVENOUS; SOFT TISSUE PRN
Status: DISCONTINUED | OUTPATIENT
Start: 2022-08-26 | End: 2022-08-26 | Stop reason: SDUPTHER

## 2022-08-26 RX ORDER — OXYCODONE HYDROCHLORIDE 10 MG/1
10 TABLET ORAL EVERY 4 HOURS PRN
Status: CANCELLED | OUTPATIENT
Start: 2022-08-26

## 2022-08-26 RX ORDER — SODIUM CHLORIDE 0.9 % (FLUSH) 0.9 %
5-40 SYRINGE (ML) INJECTION EVERY 12 HOURS SCHEDULED
Status: CANCELLED | OUTPATIENT
Start: 2022-08-26

## 2022-08-26 RX ORDER — MEPERIDINE HYDROCHLORIDE 25 MG/ML
12.5 INJECTION INTRAMUSCULAR; INTRAVENOUS; SUBCUTANEOUS EVERY 5 MIN PRN
Status: DISCONTINUED | OUTPATIENT
Start: 2022-08-26 | End: 2022-08-26 | Stop reason: HOSPADM

## 2022-08-26 RX ORDER — METRONIDAZOLE 500 MG/100ML
500 INJECTION, SOLUTION INTRAVENOUS EVERY 8 HOURS
Status: CANCELLED | OUTPATIENT
Start: 2022-08-26

## 2022-08-26 RX ORDER — OXYCODONE HYDROCHLORIDE AND ACETAMINOPHEN 5; 325 MG/1; MG/1
1-2 TABLET ORAL EVERY 6 HOURS PRN
Qty: 28 TABLET | Refills: 0 | Status: SHIPPED | OUTPATIENT
Start: 2022-08-26 | End: 2022-09-02

## 2022-08-26 RX ORDER — ONDANSETRON 2 MG/ML
4 INJECTION INTRAMUSCULAR; INTRAVENOUS
Status: DISCONTINUED | OUTPATIENT
Start: 2022-08-26 | End: 2022-08-26 | Stop reason: HOSPADM

## 2022-08-26 RX ORDER — LABETALOL HYDROCHLORIDE 5 MG/ML
5 INJECTION, SOLUTION INTRAVENOUS
Status: DISCONTINUED | OUTPATIENT
Start: 2022-08-26 | End: 2022-08-26 | Stop reason: HOSPADM

## 2022-08-26 RX ORDER — ONDANSETRON 2 MG/ML
8 INJECTION INTRAMUSCULAR; INTRAVENOUS ONCE
Status: COMPLETED | OUTPATIENT
Start: 2022-08-26 | End: 2022-08-26

## 2022-08-26 RX ORDER — MAGNESIUM HYDROXIDE 1200 MG/15ML
LIQUID ORAL CONTINUOUS PRN
Status: DISCONTINUED | OUTPATIENT
Start: 2022-08-26 | End: 2022-08-26 | Stop reason: HOSPADM

## 2022-08-26 RX ORDER — SODIUM CHLORIDE 0.9 % (FLUSH) 0.9 %
5-40 SYRINGE (ML) INJECTION PRN
Status: DISCONTINUED | OUTPATIENT
Start: 2022-08-26 | End: 2022-08-26 | Stop reason: HOSPADM

## 2022-08-26 RX ORDER — ENOXAPARIN SODIUM 100 MG/ML
40 INJECTION SUBCUTANEOUS DAILY
Status: CANCELLED | OUTPATIENT
Start: 2022-08-26

## 2022-08-26 RX ORDER — MORPHINE SULFATE 4 MG/ML
4 INJECTION, SOLUTION INTRAMUSCULAR; INTRAVENOUS
Status: CANCELLED | OUTPATIENT
Start: 2022-08-26

## 2022-08-26 RX ORDER — SUCCINYLCHOLINE/SOD CL,ISO/PF 200MG/10ML
SYRINGE (ML) INTRAVENOUS PRN
Status: DISCONTINUED | OUTPATIENT
Start: 2022-08-26 | End: 2022-08-26 | Stop reason: SDUPTHER

## 2022-08-26 RX ORDER — KETOROLAC TROMETHAMINE 30 MG/ML
INJECTION, SOLUTION INTRAMUSCULAR; INTRAVENOUS PRN
Status: DISCONTINUED | OUTPATIENT
Start: 2022-08-26 | End: 2022-08-26 | Stop reason: SDUPTHER

## 2022-08-26 RX ORDER — PROPOFOL 10 MG/ML
INJECTION, EMULSION INTRAVENOUS PRN
Status: DISCONTINUED | OUTPATIENT
Start: 2022-08-26 | End: 2022-08-26 | Stop reason: SDUPTHER

## 2022-08-26 RX ORDER — OXYCODONE HYDROCHLORIDE 5 MG/1
5 TABLET ORAL PRN
Status: COMPLETED | OUTPATIENT
Start: 2022-08-26 | End: 2022-08-26

## 2022-08-26 RX ORDER — ONDANSETRON 2 MG/ML
INJECTION INTRAMUSCULAR; INTRAVENOUS PRN
Status: DISCONTINUED | OUTPATIENT
Start: 2022-08-26 | End: 2022-08-26 | Stop reason: SDUPTHER

## 2022-08-26 RX ORDER — MORPHINE SULFATE 2 MG/ML
2 INJECTION, SOLUTION INTRAMUSCULAR; INTRAVENOUS
Status: CANCELLED | OUTPATIENT
Start: 2022-08-26

## 2022-08-26 RX ORDER — LIDOCAINE HYDROCHLORIDE 20 MG/ML
INJECTION, SOLUTION EPIDURAL; INFILTRATION; INTRACAUDAL; PERINEURAL PRN
Status: DISCONTINUED | OUTPATIENT
Start: 2022-08-26 | End: 2022-08-26 | Stop reason: SDUPTHER

## 2022-08-26 RX ORDER — MAGNESIUM SULFATE IN WATER 40 MG/ML
2000 INJECTION, SOLUTION INTRAVENOUS PRN
Status: CANCELLED | OUTPATIENT
Start: 2022-08-26

## 2022-08-26 RX ORDER — BUPIVACAINE HYDROCHLORIDE 5 MG/ML
INJECTION, SOLUTION EPIDURAL; INTRACAUDAL
Status: COMPLETED | OUTPATIENT
Start: 2022-08-26 | End: 2022-08-26

## 2022-08-26 RX ORDER — ACETAMINOPHEN 325 MG/1
650 TABLET ORAL EVERY 4 HOURS PRN
Status: CANCELLED | OUTPATIENT
Start: 2022-08-26

## 2022-08-26 RX ORDER — SODIUM CHLORIDE 9 MG/ML
INJECTION, SOLUTION INTRAVENOUS CONTINUOUS PRN
Status: DISCONTINUED | OUTPATIENT
Start: 2022-08-26 | End: 2022-08-26 | Stop reason: SDUPTHER

## 2022-08-26 RX ORDER — 0.9 % SODIUM CHLORIDE 0.9 %
1000 INTRAVENOUS SOLUTION INTRAVENOUS ONCE
Status: COMPLETED | OUTPATIENT
Start: 2022-08-26 | End: 2022-08-26

## 2022-08-26 RX ORDER — OXYCODONE HYDROCHLORIDE 5 MG/1
5 TABLET ORAL EVERY 4 HOURS PRN
Status: CANCELLED | OUTPATIENT
Start: 2022-08-26

## 2022-08-26 RX ORDER — CIPROFLOXACIN 2 MG/ML
400 INJECTION, SOLUTION INTRAVENOUS EVERY 12 HOURS
Status: CANCELLED | OUTPATIENT
Start: 2022-08-26

## 2022-08-26 RX ORDER — IBUPROFEN 600 MG/1
600 TABLET ORAL EVERY 6 HOURS PRN
Qty: 80 TABLET | Refills: 0 | Status: SHIPPED | OUTPATIENT
Start: 2022-08-26

## 2022-08-26 RX ORDER — DEXTROSE AND SODIUM CHLORIDE 5; .45 G/100ML; G/100ML
INJECTION, SOLUTION INTRAVENOUS CONTINUOUS
Status: DISCONTINUED | OUTPATIENT
Start: 2022-08-26 | End: 2022-08-26 | Stop reason: HOSPADM

## 2022-08-26 RX ORDER — FENTANYL CITRATE 50 UG/ML
INJECTION, SOLUTION INTRAMUSCULAR; INTRAVENOUS PRN
Status: DISCONTINUED | OUTPATIENT
Start: 2022-08-26 | End: 2022-08-26 | Stop reason: SDUPTHER

## 2022-08-26 RX ORDER — ONDANSETRON 2 MG/ML
4 INJECTION INTRAMUSCULAR; INTRAVENOUS EVERY 6 HOURS PRN
Status: CANCELLED | OUTPATIENT
Start: 2022-08-26

## 2022-08-26 RX ORDER — DIPHENHYDRAMINE HYDROCHLORIDE 50 MG/ML
12.5 INJECTION INTRAMUSCULAR; INTRAVENOUS
Status: DISCONTINUED | OUTPATIENT
Start: 2022-08-26 | End: 2022-08-26 | Stop reason: HOSPADM

## 2022-08-26 RX ORDER — SODIUM CHLORIDE, SODIUM LACTATE, POTASSIUM CHLORIDE, CALCIUM CHLORIDE 600; 310; 30; 20 MG/100ML; MG/100ML; MG/100ML; MG/100ML
INJECTION, SOLUTION INTRAVENOUS CONTINUOUS
Status: CANCELLED | OUTPATIENT
Start: 2022-08-26

## 2022-08-26 RX ORDER — SODIUM CHLORIDE 0.9 % (FLUSH) 0.9 %
5-40 SYRINGE (ML) INJECTION PRN
Status: CANCELLED | OUTPATIENT
Start: 2022-08-26

## 2022-08-26 RX ORDER — ONDANSETRON 4 MG/1
4 TABLET, ORALLY DISINTEGRATING ORAL EVERY 8 HOURS PRN
Status: CANCELLED | OUTPATIENT
Start: 2022-08-26

## 2022-08-26 RX ORDER — SODIUM CHLORIDE 9 MG/ML
INJECTION, SOLUTION INTRAVENOUS PRN
Status: CANCELLED | OUTPATIENT
Start: 2022-08-26

## 2022-08-26 RX ORDER — POTASSIUM CHLORIDE 7.45 MG/ML
10 INJECTION INTRAVENOUS PRN
Status: CANCELLED | OUTPATIENT
Start: 2022-08-26

## 2022-08-26 RX ADMIN — MIDAZOLAM 2 MG: 1 INJECTION INTRAMUSCULAR; INTRAVENOUS at 07:43

## 2022-08-26 RX ADMIN — DEXTROSE AND SODIUM CHLORIDE: 5; 450 INJECTION, SOLUTION INTRAVENOUS at 06:23

## 2022-08-26 RX ADMIN — DEXAMETHASONE SODIUM PHOSPHATE 8 MG: 4 INJECTION, SOLUTION INTRAMUSCULAR; INTRAVENOUS at 07:54

## 2022-08-26 RX ADMIN — FENTANYL CITRATE 25 MCG: 50 INJECTION, SOLUTION INTRAMUSCULAR; INTRAVENOUS at 08:53

## 2022-08-26 RX ADMIN — ONDANSETRON 8 MG: 2 INJECTION INTRAMUSCULAR; INTRAVENOUS at 03:57

## 2022-08-26 RX ADMIN — LIDOCAINE HYDROCHLORIDE 80 MG: 20 INJECTION, SOLUTION EPIDURAL; INFILTRATION; INTRACAUDAL; PERINEURAL at 07:50

## 2022-08-26 RX ADMIN — SODIUM CHLORIDE: 9 INJECTION, SOLUTION INTRAVENOUS at 07:30

## 2022-08-26 RX ADMIN — IOPAMIDOL 75 ML: 755 INJECTION, SOLUTION INTRAVENOUS at 05:14

## 2022-08-26 RX ADMIN — SODIUM CHLORIDE 1000 ML: 9 INJECTION, SOLUTION INTRAVENOUS at 03:56

## 2022-08-26 RX ADMIN — PROPOFOL 200 MG: 10 INJECTION, EMULSION INTRAVENOUS at 07:50

## 2022-08-26 RX ADMIN — SUGAMMADEX 150 MG: 100 INJECTION, SOLUTION INTRAVENOUS at 08:17

## 2022-08-26 RX ADMIN — FENTANYL CITRATE 50 MCG: 50 INJECTION INTRAMUSCULAR; INTRAVENOUS at 07:47

## 2022-08-26 RX ADMIN — SODIUM CHLORIDE 3000 MG: 900 INJECTION INTRAVENOUS at 05:50

## 2022-08-26 RX ADMIN — KETOROLAC TROMETHAMINE 30 MG: 30 INJECTION, SOLUTION INTRAMUSCULAR at 08:15

## 2022-08-26 RX ADMIN — Medication 120 MG: at 07:50

## 2022-08-26 RX ADMIN — HYDROMORPHONE HYDROCHLORIDE 1 MG: 1 INJECTION, SOLUTION INTRAMUSCULAR; INTRAVENOUS; SUBCUTANEOUS at 03:56

## 2022-08-26 RX ADMIN — ROCURONIUM BROMIDE 5 MG: 10 INJECTION INTRAVENOUS at 07:50

## 2022-08-26 RX ADMIN — FENTANYL CITRATE 50 MCG: 50 INJECTION INTRAMUSCULAR; INTRAVENOUS at 08:04

## 2022-08-26 RX ADMIN — OXYCODONE 5 MG: 5 TABLET ORAL at 09:34

## 2022-08-26 RX ADMIN — ONDANSETRON 4 MG: 2 INJECTION INTRAMUSCULAR; INTRAVENOUS at 08:15

## 2022-08-26 RX ADMIN — ROCURONIUM BROMIDE 25 MG: 10 INJECTION INTRAVENOUS at 07:56

## 2022-08-26 ASSESSMENT — PAIN DESCRIPTION - ORIENTATION
ORIENTATION: LEFT
ORIENTATION: RIGHT;LOWER
ORIENTATION: RIGHT

## 2022-08-26 ASSESSMENT — PAIN SCALES - GENERAL
PAINLEVEL_OUTOF10: 7
PAINLEVEL_OUTOF10: 4
PAINLEVEL_OUTOF10: 6
PAINLEVEL_OUTOF10: 4

## 2022-08-26 ASSESSMENT — PAIN - FUNCTIONAL ASSESSMENT
PAIN_FUNCTIONAL_ASSESSMENT: ACTIVITIES ARE NOT PREVENTED
PAIN_FUNCTIONAL_ASSESSMENT: 0-10
PAIN_FUNCTIONAL_ASSESSMENT: 0-10

## 2022-08-26 ASSESSMENT — PAIN DESCRIPTION - LOCATION
LOCATION: ABDOMEN

## 2022-08-26 ASSESSMENT — PAIN DESCRIPTION - DESCRIPTORS
DESCRIPTORS: ACHING
DESCRIPTORS: CRAMPING

## 2022-08-26 ASSESSMENT — PAIN DESCRIPTION - PAIN TYPE: TYPE: SURGICAL PAIN

## 2022-08-26 NOTE — PROGRESS NOTES
Transfers to chair with SBA without dizziness. Rates pain 4/10 left side abd. Ice pack applied. Eats snack and sips water to take pain pill with. SO at side.

## 2022-08-26 NOTE — PROGRESS NOTES
Patient to pacu from OR1, vss on room air, patient laying on right side for comfort, will continue to monitor.

## 2022-08-26 NOTE — ED PROVIDER NOTES
[address]      Pt Name: Marty Officer  MRN: [id number]  Robin 1980  Date of evaluation: 8/26/2022  Provider: Catherine Vincent, [address] [place] [address]  Chief Complaint   Patient presents with    Abdominal Pain     Rt lower quad pain tonight ,     Nausea     Vomiting and diarrhea earlier today now constipated       I wore personal protective equipment when I was in the room the entire time. This includes gloves, N95 mask, face shield, and a glove over my stethoscope for protection. HPI  Marty Officer is a 43 y.o. female who presents with right lower quadrant abdominal pain. She states it is right by her hip. It started earlier this evening. She denies any fevers or chills. She has been nausea and vomiting. She had diarrhea earlier today. She denies any previous surgeries except a D&C. She denies a history of cholecystectomy, appendectomy, hysterectomy or C-sections. She denies a history of hernia repairs. Moving makes it worse and rest makes it better. She describes it as sharp and severe  ? REVIEW OF SYSTEMS  All systems negative except as noted in the HPI. Reviewed Nurses' notes and concur. No LMP recorded. Patient has had an implant. PAST MEDICAL HISTORY  Past Medical History:   Diagnosis Date    Hypertension        FAMILY HISTORY  No family history on file. SOCIAL HISTORY   reports that she has quit smoking. She has never used smokeless tobacco. She reports current alcohol use. She reports that she does not use drugs.     SURGICAL HISTORY  Past Surgical History:   Procedure Laterality Date    DILATION AND CURETTAGE OF UTERUS      WISDOM TOOTH EXTRACTION         CURRENT MEDICATIONS  Current Outpatient Rx   Medication Sig Dispense Refill    ibuprofen (ADVIL;MOTRIN) 800 MG tablet Take 1 tablet by mouth every 8 hours as needed for Pain 30 tablet 0    loratadine (CLARITIN) 10 MG tablet loratadine 10 mg tablet   Take COMPREHENSIVE METABOLIC PANEL W/ REFLEX TO MG FOR LOW K - Abnormal; Notable for the following components:    Potassium reflex Magnesium 3.3 (*)     Glucose 134 (*)     Creatinine 0.5 (*)     AST 7 (*)     All other components within normal limits   URINALYSIS WITH REFLEX TO CULTURE - Abnormal; Notable for the following components:    Ketones, Urine TRACE (*)     All other components within normal limits   PREGNANCY, URINE   MAGNESIUM       RADIOLOGY/PROCEDURES  I personally reviewed the images for this case. CT ABDOMEN PELVIS W IV CONTRAST Additional Contrast? None   Final Result   Findings of acute non perforated appendicitis. Appendix is dilated in the   right lower quadrant to 1.1 cm. The wall of the appendix is thickened. There   is increased density seen in the tip of the appendix, compatible with   appendicolith. There is moderate periappendiceal inflammatory change. No   periappendiceal abscess or obvious free air              [facility]  Pertinent Labs & Imaging studies reviewed.  (See chart for details)    Vitals:    08/26/22 0322 08/26/22 0351   BP: (!) 146/100 (!) 148/92   Pulse: 90    Resp: 20    Temp: 97.1 °F (36.2 °C)    TempSrc: Oral    SpO2: 98% 97%   Weight: 181 lb 3.5 oz (82.2 kg)    Height: 5' 7\" (1.702 m)        Medications   ampicillin-sulbactam (UNASYN) 3000 mg in 100 mL NS IVPB minibag (has no administration in time range)   dextrose 5 % and 0.45 % sodium chloride infusion (has no administration in time range)   0.9 % sodium chloride bolus (0 mLs IntraVENous Stopped 8/26/22 0500)   HYDROmorphone (DILAUDID) injection 1 mg (1 mg IntraVENous Given 8/26/22 0356)   ondansetron (ZOFRAN) injection 8 mg (8 mg IntraVENous Given 8/26/22 0357)   iopamidol (ISOVUE-370) 76 % injection 75 mL (75 mLs IntraVENous Given 8/26/22 0514)       New Prescriptions    No medications on file       SEP-1 CORE MEASURE DATA  Exclusion criteria: the patient is NOT to be included for sepsis due to:  SIRS criteria are not met    Patient remained stable in the ED. her white count was 15.9. Her CT scan revealed acute appendicitis without perforation. Remainder of her work-up was negative. Therefore, general surgery was notified and they will admit the patient to the hospital for further evaluation and treatment. Patient was given Unasyn in the emerged apartment as well as IV fluids. She was started on D5 half normal saline at 125 an hour. General surgery was notified at 1    The patient's blood pressure was found to be elevated according to CMS/Medicare and the Affordable Care Act/ObamaCare criteria. Elevated blood pressure could occur because of pain or anxiety or other reasons and does not mean that they need to have their blood pressure treated or medications otherwise adjusted. However, this could also be a sign that they will need to have their blood pressure treated or medications changed. The patient was instructed to follow up closely with their personal physician to have their blood pressure rechecked. The patient was instructed to take a list of recent blood pressure readings to their next visit with their personal physician. (This chart has been completed using [address]. Although attempts have been made to ensure accuracy, words and/or phrases may not be transcribed as intended.)    Patient refused pain medicines at the time of her exam.  But was requesting them within 15 minutes. IMPRESSION(S):  1. Acute appendicitis with generalized peritonitis, without gangrene or abscess, unspecified whether perforation present        ? Recheck Times: 8042  Critical Care Time: 35 minutes    Diagnostic considerations include but are not limited to:   Abdominal Aortic Aneurysm, Acute Coronary Syndrome, Ischemic Bowel, Bowel Obstruction (including Gastric Outlet Obstruction), cyclical vomiting syndrome, PUD, GERD, Acute Cholecystitis, Pancreatitis, Hepatitis, Colitis, SMA Syndrome, Mesenteric Steal Syndrome, Splanchnic Vein Thrombosis, cyclical vomiting syndrome, other       Toshia Goddard DO  08/26/22 9853

## 2022-08-26 NOTE — ANESTHESIA POSTPROCEDURE EVALUATION
Department of Anesthesiology  Postprocedure Note    Patient: Mavis Anthony  MRN: [id number]  YOB: 1980  Date of evaluation: 8/26/2022      Procedure Summary     Date: 08/26/22 Room / Location:  [facility] Ling [address] [place] [address]    Anesthesia Start: 46 Anesthesia Stop: 6637    Procedure: LAPAROSCOPIC APPENDECTOMY (Abdomen) Diagnosis:       Acute appendicitis, unspecified acute appendicitis type      (Acute appendicitis)    Surgeons: Leanna Baez MD Responsible Provider: Adin Mccoy MD    Anesthesia Type: general ASA Status: 2 - Emergent          Anesthesia Type: No value filed.     Brian Phase I: Brian Score: 9    Brian Phase II: Brian Score: 10      Anesthesia Post Evaluation    Patient location during evaluation: PACU  Patient participation: complete - patient participated  Level of consciousness: awake and alert  Pain score: 0  Airway patency: patent  Nausea & Vomiting: no nausea and no vomiting  Complications: no  Cardiovascular status: blood pressure returned to baseline  Respiratory status: acceptable  Hydration status: euvolemic

## 2022-08-26 NOTE — H&P
General Surgery History & Physical      Mavis Anthony   : 1980 MRN: [id number]  Date of Admission: 2022  Admitting Karoline Hines MD  Primary Care Physician: [facility] (Inactive)    Diagnosis acute appendicitis    History of Present Illness  Mavis Anthony is a 43 y.o. female who presented today with a 6 hour history of RLQ pain. She started feeling poorly last evening. Pain has progressively worsened, and is exacerbated by movement.  + nausea, emesis, diarrhea. Denies any fevers or chills. No prior abdominal surgeries. Past Medical History:   Diagnosis Date    Hypertension      Past Surgical History:   Procedure Laterality Date    DILATION AND CURETTAGE OF UTERUS      WISDOM TOOTH EXTRACTION       No family history on file. Social History     Socioeconomic History    Marital status: Single     Spouse name: Not on file    Number of children: Not on file    Years of education: Not on file    Highest education level: Not on file   Occupational History    Not on file   Tobacco Use    Smoking status: Former    Smokeless tobacco: Never   Vaping Use    Vaping Use: Never used   Substance and Sexual Activity    Alcohol use: Yes     Comment: social    Drug use: No    Sexual activity: Yes     Partners: Male   Other Topics Concern    Not on file   Social History Narrative    Not on file     Social Determinants of Health     Financial Resource Strain: Not on file   Food Insecurity: Not on file   Transportation Needs: Not on file   Physical Activity: Not on file   Stress: Not on file   Social Connections: Not on file   Intimate Partner Violence: Not on file   Housing Stability: Not on file     Allergies   Allergen Reactions    No Known Allergies      Prior to Admission medications    Medication Sig Start Date End Date Taking?  Authorizing Provider   ibuprofen (ADVIL;MOTRIN) 800 MG tablet Take 1 tablet by mouth every 8 hours as needed for Pain 20   ROLANDO Morel - CNP   loratadine (CLARITIN) 10 MG tablet loratadine 10 mg tablet   Take 1 tablet every day by oral route. Historical Provider, MD   Etonogestrel-Ethinyl Estradiol (Narayan Christopher) Place vaginally    Historical Provider, MD         Review of Systems  Pertinent positives are in HPI, otherwise all systems reviewed and negative    Physical Exam  Vitals:    08/26/22 0720   BP: 124/82   Pulse: 75   Resp: 15   Temp: 97.2 °F (36.2 °C)   SpO2: 94%       General/Appearance: NAD, alert and oriented  HEENT: PERRLA, Conjunctiva non injected, no scleral icterus. Mucous membranes pink and moist.  Neck is symmetrical. Trachea appears midline. Lung: normal respiratory effort, no accessory muscle use  Cardiac: regular rate and rhythm  Abdomen: soft, ND, moderate RLQ tenderness with focal peritonitis  Neuro: No gross motor or sensory deficits. Skin: No open wounds or rashes. Labs  Recent Labs     08/26/22  0342   WBC 15.8*   HGB 14.6   HCT 41.6        Recent Labs     08/26/22  0342      K 3.3*   CL 99   CO2 25   BUN 13   GFRAA >60   MG 1.90     Recent Labs     08/26/22  0342   AST 7*   ALT 16     No results for input(s): UOSM in the last 72 hours. Invalid input(s): [facility], [facility], [facility], [facility], [facility]    Imaging  CT ABDOMEN PELVIS W IV CONTRAST Additional Contrast? None   Final Result   Findings of acute non perforated appendicitis. Appendix is dilated in the   right lower quadrant to 1.1 cm. The wall of the appendix is thickened. There   is increased density seen in the tip of the appendix, compatible with   appendicolith. There is moderate periappendiceal inflammatory change. No   periappendiceal abscess or obvious free air            CT abd/pelvis personally reviewed, which shows a dilated and inflamed appendix with appendicolith    Assessment  Yanira Watters is a 43 y.o. female who presents with acute appendicitis    Plan    1.  Acute appendicitis  Will proceed to the OR for laparoscopic appendectomy, possible open. The risks, benefits, and alternatives were discussed with the patient and she is willing to proceed.   Abx - IV unasyn given  Bilateral SCDs  NPO  Hopefully home later today if surgery uneventful    Shai Mukherjee MD

## 2022-08-26 NOTE — DISCHARGE INSTRUCTIONS
Kt Sierra MD     General and Vascular Surgery   Juliane Jamison MD     [address] [facility] MD [place]     [address] [place] [address], [place] [facility]  Sevensandeep WeaverROLANDO mata - CNP   Phone: [phone]           Fax: [phone]                                                         POST-OPERATIVE INSTRUCTIONS FOR LAPAROSCOPIC APPENDECTOMY    Call the office to schedule your post-operative appointment with your surgeon for two (2) weeks. You will have water occlusive glue closing your incisions. You may shower. Wash incisions gently, and pat them dry. Do not rub your incisions. Do not soak incisions in tub baths, hot tubs or pools    General guidelines for activity:  Avoid strenuous activity or lifting anything heavier than 15 pounds for 4-6 weeks. It is OK to be up walking around; walking up and down stairs is also OK. Do what is comfortable: stop and rest when you feel tired. Drink plenty of fluids and stay on a bland diet for 2-3 days after surgery. Do NOT drive for one week and while taking your narcotic pain medicine. Watch for signs of infection:   Fever over 100.5°   Excessive warmth or bright redness around your incisions  Leakage of bloody or cloudy fluid from your incisions    During the laparoscopic procedure that you had, gas is pumped into the abdominal cavity. You may feel abdominal, shoulder, or rib pain for a few days due to this. You will have pain medicine ordered. Take as directed. If you experience constipation  Increase your water intake, and activity; walking is best.  A stool softener or mild laxative may be necessary if you still have not had a bowel  movement ; call the office for further instructions.

## 2022-08-26 NOTE — ANESTHESIA PRE PROCEDURE
Department of Anesthesiology  Preprocedure Note       Name:  Colin Velez   Age:  43 y.o.  :  1980                                          MRN:  [id number]         Date:  2022      Surgeon: Pepe Higgins):  Mohit Bryant MD    Procedure: Procedure(s):  LAPAROSCOPIC APPENDECTOMY, POSSIBLE OPEN    Medications prior to admission:   Prior to Admission medications    Medication Sig Start Date End Date Taking? Authorizing Provider   ibuprofen (ADVIL;MOTRIN) 800 MG tablet Take 1 tablet by mouth every 8 hours as needed for Pain 20   Benna Rom, APRN - CNP   loratadine (CLARITIN) 10 MG tablet loratadine 10 mg tablet   Take 1 tablet every day by oral route. Historical Provider, MD   Etonogestrel-Ethinyl Estradiol (Naga Parker) Place vaginally    Historical Provider, MD       Current medications:    Current Facility-Administered Medications   Medication Dose Route Frequency Provider Last Rate Last Admin    dextrose 5 % and 0.45 % sodium chloride infusion   IntraVENous Continuous Pinky [place],  mL/hr at 22 0623 New Bag at 22 3098       Allergies: Allergies   Allergen Reactions    No Known Allergies        Problem List:    Patient Active Problem List   Diagnosis Code    Closed displaced fracture of fourth metatarsal bone of left foot S92.342A    Closed displaced fracture of third metatarsal bone of left foot S92.332A    Closed displaced fracture of second metatarsal bone of left foot S92.322A    Dog bite of left hand S61.452A, W54. 0XXA    Acute appendicitis K35.80       Past Medical History:        Diagnosis Date    Hypertension        Past Surgical History:        Procedure Laterality Date    DILATION AND CURETTAGE OF UTERUS      WISDOM TOOTH EXTRACTION         Social History:    Social History     Tobacco Use    Smoking status: Former    Smokeless tobacco: Never   Substance Use Topics    Alcohol use: Yes     Comment: social Lab Results   Component Value Date    PREGTESTUR Negative 08/26/2022        ABGs: No results found for: PHART, PO2ART, SZJ5QTW, AYX6JDS, BEART, I2ZVUPNP     Type & Screen (If Applicable):  No results found for: LABABO, LABRH    Drug/Infectious Status (If Applicable):  No results found for: HIV, HEPCAB    COVID-19 Screening (If Applicable): No results found for: COVID19        Anesthesia Evaluation  Patient summary reviewed  Airway: Mallampati: II  TM distance: >3 FB   Neck ROM: full  Mouth opening: > = 3 FB   Dental: normal exam         Pulmonary:Negative Pulmonary ROS and normal exam  breath sounds clear to auscultation                             Cardiovascular:  Exercise tolerance: good (>4 METS),   (+) hypertension:,         Rhythm: regular  Rate: normal                    Neuro/Psych:   Negative Neuro/Psych ROS              GI/Hepatic/Renal:   (+) GERD:,           Endo/Other: Negative Endo/Other ROS                    Abdominal:             Vascular: negative vascular ROS. Other Findings:           Anesthesia Plan      general     ASA 2 - emergent       Induction: intravenous. MIPS: Postoperative opioids intended and Prophylactic antiemetics administered. Anesthetic plan and risks discussed with patient. Plan discussed with CRNA.     Attending anesthesiologist reviewed and agrees with Van Bernheim, MD   8/26/2022

## 2022-09-06 ENCOUNTER — HOSPITAL ENCOUNTER (OUTPATIENT)
Dept: WOMENS IMAGING | Age: 42
Discharge: HOME | End: 2022-09-06
Payer: COMMERCIAL

## 2022-09-06 ENCOUNTER — HOSPITAL ENCOUNTER (OUTPATIENT)
Dept: ULTRASOUND IMAGING | Age: 42
Discharge: HOME | End: 2022-09-06
Payer: COMMERCIAL

## 2022-09-06 DIAGNOSIS — R92.8 ABNORMAL MAMMOGRAM: ICD-10-CM

## 2022-09-06 PROCEDURE — 76642 ULTRASOUND BREAST LIMITED: CPT

## 2022-09-07 NOTE — DISCHARGE SUMMARY
Physician Discharge Summary     Patient ID:  Renetta Coffman  [id number]  74 y.o.  1980    Admit date: 8/26/2022    Discharge date and time: 8/26/2022 10:29 AM     Admitting Physician: Kash David MD     Discharge Physician: same    Admission Diagnoses: Acute appendicitis [K35.80]  Acute appendicitis with generalized peritonitis, without gangrene or abscess, unspecified whether perforation present [K35.20]    Discharge Diagnoses: same, s/p laparoscopic appendectomy    Admission Condition: fair    Discharged Condition: good    Indication for Admission: abdominal pain, surgery     Hospital Course:   Acute appendicitis  -s/p 8/26 laparoscopic appendectomy  Stable post op course. Discharged home. Consults: none    Significant Diagnostic Studies:   CT ABDOMEN PELVIS W IV CONTRAST Additional Contrast? None   Final Result   Findings of acute non perforated appendicitis. Appendix is dilated in the   right lower quadrant to 1.1 cm. The wall of the appendix is thickened. There   is increased density seen in the tip of the appendix, compatible with   appendicolith. There is moderate periappendiceal inflammatory change. No   periappendiceal abscess or obvious free air              Discharge Exam:  General/Appearance: NAD, alert oriented x3  HEENT: NCAT, PERRLA, Conjunctiva non injected, no scleral icterus. External ears and nares normal bilaterally. Mucous membranes pink and moist.   Neck: Neck is symmetrical. Trachea appears midline. Lung: clear to auscultation bilaterally, normal rate and effort  Cardiac: regular rate and rhythm  Abdomen: soft, appropriate incisional tenderness. Incisions c/d/i  Neuro: No gross motor or sensory deficits. Skin: No open wounds or rashes.   MSK: normal ROM, no edema  Psych: normal insight, judgment     Disposition: home    Patient Instructions:   Discharge Medication List as of 8/26/2022 10:05 AM             Details   oxyCODONE-acetaminophen (PERCOCET) 5-325 MG per tablet Take 1-2 tablets by mouth every 6 hours as needed for Pain for up to 7 days. Intended supply: 7 days. Take lowest dose possible to manage pain, Disp-28 tablet, R-0Print      docusate sodium (COLACE) 100 MG capsule Take 1 capsule by mouth 2 times daily as needed for Constipation (take while on narcotic pain medication), Disp-60 capsule, R-0Print                Details   ibuprofen (ADVIL;MOTRIN) 600 MG tablet Take 1 tablet by mouth every 6 hours as needed for Pain, Disp-80 tablet, R-0Print                Details   loratadine (CLARITIN) 10 MG tablet loratadine 10 mg tablet   Take 1 tablet every day by oral route. Historical Med      Etonogestrel-Ethinyl Estradiol (NUVARING VA) Place vaginallyHistorical Med            Activity: activity as tolerated, no driving while on analgesics, and no heavy lifting for 6 weeks  Diet: regular  Wound Care: keep wound clean and dry and as directed    Follow-up with your surgeon in 2 weeks.     Signed:  ROLANDO Schofield CNP  9/7/2022  12:58 PM

## 2022-09-08 ENCOUNTER — OFFICE VISIT (OUTPATIENT)
Dept: SURGERY | Age: 42
End: 2022-09-08

## 2022-09-08 VITALS — HEART RATE: 89 BPM | DIASTOLIC BLOOD PRESSURE: 89 MMHG | SYSTOLIC BLOOD PRESSURE: 125 MMHG

## 2022-09-08 DIAGNOSIS — K35.30 ACUTE APPENDICITIS WITH LOCALIZED PERITONITIS, WITHOUT PERFORATION, ABSCESS, OR GANGRENE: Primary | ICD-10-CM

## 2022-09-08 DIAGNOSIS — Z90.49 S/P LAPAROSCOPIC APPENDECTOMY: ICD-10-CM

## 2022-09-08 PROCEDURE — 99024 POSTOP FOLLOW-UP VISIT: CPT | Performed by: SURGERY

## 2022-09-08 NOTE — PROGRESS NOTES
Post Operative Visit    [facility] [place]  [place] and Vascular Surgery   Poncho Calderón MD    [facility] [address], [address] [facility], [facility] [address]  Phone: [phone]  Fax: [phone]    Kimberli Montilla   YOB: 1980    Date of Visit:  9/8/2022    No ref. provider found  [facility] (Inactive)    Subjective:     Kimberli Montilla presents for a two week follow-up s/p laparoscopic appendectomy. Overall she has been doing well since her operation. She has been eating/drinking without difficulty. Her bowel movements have returned to normal.  She has not had any nausea and vomiting. There has been some relief of her pain, which is mainly at her LLQ incision. She denies any fevers or chills. Allergies   Allergen Reactions    No Known Allergies      Outpatient Medications Marked as Taking for the 9/8/22 encounter (Office Visit) with Royann Epley, MD   Medication Sig Dispense Refill    ibuprofen (ADVIL;MOTRIN) 600 MG tablet Take 1 tablet by mouth every 6 hours as needed for Pain 80 tablet 0    docusate sodium (COLACE) 100 MG capsule Take 1 capsule by mouth 2 times daily as needed for Constipation (take while on narcotic pain medication) 60 capsule 0    loratadine (CLARITIN) 10 MG tablet loratadine 10 mg tablet   Take 1 tablet every day by oral route. Etonogestrel-Ethinyl Estradiol (NUVARING VA) Place vaginally         Vitals:    09/08/22 1427   BP: 125/89   Pulse: 89     There is no height or weight on file to calculate BMI.      Wt Readings from Last 3 Encounters:   08/26/22 181 lb 3.5 oz (82.2 kg)   08/21/20 179 lb (81.2 kg)   05/29/20 179 lb 14.3 oz (81.6 kg)     BP Readings from Last 3 Encounters:   09/08/22 125/89   08/26/22 120/74   05/16/20 (!) 151/97          Objective:    CONSTITUTIONAL:  awake, alert, no apparent distress    LUNGS:  Resp easy and unlabored  ABDOMEN:  incisions c/d/I, no erythema or induration, soft, non-distended, appropriately tender, voluntary guarding absent,  and hernia absent  MUSCULOSKELETAL: No edema  NEUROLOGIC:  Mental Status Exam:  Level of Alertness:   awake  Orientation:   person, place, time  SKIN: as above      Pathology:  FINAL DIAGNOSIS:     Appendix, appendectomy:   - Acute appendicitis and periappendicitis. RYAN/RYAN     ASSESSMENT:     Diagnosis Orders   1. Acute appendicitis with localized peritonitis, without perforation, abscess, or gangrene        2. S/P laparoscopic appendectomy            PLAN:    Grace Varela is doing well s/p laparoscopic appendectomy. Incisions are healing appropriately. Will plan on having her follow up prn. She is to continue with lifting restrictions for the next 4 weeks to reduce the chance of hernia. Work excuse given.     Electronically signed by Royer Lim MD on 9/8/2022 at 2:42 PM

## 2022-09-08 NOTE — LETTER
[address] [facility]  [address]  [facility] [address]  Phone: [phone]  Fax: [phone]    Kelly Guzmán MD        September 8, 2022     Patient: London Land   YOB: 1980   Date of Visit: 9/8/2022       To Whom It May Concern: It is my medical opinion that Song Mendez may return to work on 9/12/22 with the following restrictions: lifting/carrying not to exceed 15 lbs. She may return to regular duty without restrictions on 10/7/22. If you have any questions or concerns, please don't hesitate to call.     Sincerely,      Kelly Guzmán MD

## 2022-11-06 NOTE — Clinical Note
Daljit Reid needs a f/u appt in office in about 10 days for XR and suture removal.  Please contact her to sched. Thanks.  NAPOLEON
uvula midline, no vesicles, no redness, and no oropharyngeal exudate.

## (undated) DEVICE — PMI DISPOSABLE PUNCTURE CLOSURE DEVICE / SUTURE GRASPER: Brand: PMI

## (undated) DEVICE — GENERAL LAPAROSCOPY: Brand: MEDLINE INDUSTRIES, INC.

## (undated) DEVICE — SUTURE VCRL + SZ 4-0 L18IN ABSRB UD L19MM PS-2 3/8 CIR PRIM VCP496H

## (undated) DEVICE — HYPODERMIC SAFETY NEEDLE: Brand: MAGELLAN

## (undated) DEVICE — INSUFFLATION NEEDLE TO ESTABLISH PNEUMOPERITONEUM.: Brand: INSUFFLATION NEEDLE

## (undated) DEVICE — SOLUTION IV IRRIG POUR BRL 0.9% SODIUM CHL 2F7124

## (undated) DEVICE — GARMENT COMPR STD FOR 17IN CALF UNIF THER FLOTRN

## (undated) DEVICE — TROCAR: Brand: KII SHIELDED BLADED ACCESS SYSTEM

## (undated) DEVICE — ADHESIVE SKIN CLOSURE TOP 36 CC HI VISC DERMBND MINI

## (undated) DEVICE — SET INSUF TUBE HEAT ISO CONN DISP

## (undated) DEVICE — GLOVE ORANGE PI 7 1/2   MSG9075

## (undated) DEVICE — GLOVE SURG SZ 8 L12IN FNGR THK79MIL GRN LTX FREE

## (undated) DEVICE — TROCAR: Brand: KII SLEEVE

## (undated) DEVICE — GOWN,AURORA,NONREINF,RAGLAN,XXL,STERILE: Brand: MEDLINE